# Patient Record
Sex: MALE | Race: WHITE
[De-identification: names, ages, dates, MRNs, and addresses within clinical notes are randomized per-mention and may not be internally consistent; named-entity substitution may affect disease eponyms.]

---

## 2021-03-22 LAB
ALBUMIN SERPL BCP-MCNC: 4.1 G/DL (ref 3.4–5)
ALBUMIN/GLOB SERPL: 1.4 {RATIO} (ref 1.1–1.5)
ALP SERPL-CCNC: 63 IU/L (ref 46–116)
ALT SERPL W P-5'-P-CCNC: 23 U/L (ref 30–65)
ANION GAP SERPL CALCULATED.3IONS-SCNC: 7 MMOL/L (ref 8–16)
APTT PPP: 29 SECONDS (ref 22–32)
AST SERPL W P-5'-P-CCNC: 20 U/L (ref 10–37)
BASOPHILS # BLD AUTO: 0.1 X10'3 (ref 0–0.2)
BASOPHILS NFR BLD AUTO: 2 % (ref 0–1)
BILIRUB SERPL-MCNC: 0.6 MG/DL (ref 0–1)
BUN SERPL-MCNC: 13 MG/DL (ref 7–18)
BUN/CREAT SERPL: 12.7 (ref 5.4–32)
CALCIUM SERPL-MCNC: 9.1 MG/DL (ref 8.5–10.1)
CHLORIDE SERPL-SCNC: 102 MMOL/L (ref 99–107)
CO2 SERPL-SCNC: 28.5 MMOL/L (ref 24–32)
CREAT SERPL-MCNC: 1.02 MG/DL (ref 0.6–1.1)
EOSINOPHIL # BLD AUTO: 0.3 X10'3 (ref 0–0.9)
EOSINOPHIL NFR BLD AUTO: 4.3 % (ref 0–6)
ERYTHROCYTE [DISTWIDTH] IN BLOOD BY AUTOMATED COUNT: 13 % (ref 11.5–14.5)
GFR SERPL CREATININE-BSD FRML MDRD: 71 ML/MIN
GLUCOSE SERPL-MCNC: 91 MG/DL (ref 70–104)
HCT VFR BLD AUTO: 38.4 % (ref 42–52)
HGB BLD-MCNC: 12.9 G/DL (ref 14–17.9)
INR PPP: 1 INR
LYMPHOCYTES # BLD AUTO: 1.9 X10'3 (ref 1.1–4.8)
LYMPHOCYTES NFR BLD AUTO: 29.5 % (ref 21–51)
MCH RBC QN AUTO: 32.2 PG (ref 27–31)
MCHC RBC AUTO-ENTMCNC: 33.7 G/DL (ref 33–36.5)
MCV RBC AUTO: 95.7 FL (ref 78–98)
MONOCYTES # BLD AUTO: 0.8 X10'3 (ref 0–0.9)
MONOCYTES NFR BLD AUTO: 11.7 % (ref 2–12)
NEUTROPHILS # BLD AUTO: 3.4 X10'3 (ref 1.8–7.7)
NEUTROPHILS NFR BLD AUTO: 52.5 % (ref 42–75)
PLATELET # BLD AUTO: 216 X10'3 (ref 140–440)
PMV BLD AUTO: 7 FL (ref 7.4–10.4)
POTASSIUM SERPL-SCNC: 4.1 MMOL/L (ref 3.4–5.1)
PROT SERPL-MCNC: 7.1 G/DL (ref 6.4–8.2)
PROTHROMBIN TIME: 10.4 SECONDS (ref 9–12)
RBC # BLD AUTO: 4.02 X10'6 (ref 4.7–6.1)
SODIUM SERPL-SCNC: 137 MMOL/L (ref 135–145)

## 2021-03-29 ENCOUNTER — HOSPITAL ENCOUNTER (OUTPATIENT)
Dept: HOSPITAL 94 - PAS | Age: 76
LOS: 1 days | Discharge: HOME | End: 2021-03-30
Attending: ORTHOPAEDIC SURGERY
Payer: MEDICARE

## 2021-03-29 VITALS — DIASTOLIC BLOOD PRESSURE: 78 MMHG | SYSTOLIC BLOOD PRESSURE: 124 MMHG

## 2021-03-29 VITALS — DIASTOLIC BLOOD PRESSURE: 69 MMHG | SYSTOLIC BLOOD PRESSURE: 104 MMHG

## 2021-03-29 VITALS — SYSTOLIC BLOOD PRESSURE: 109 MMHG | DIASTOLIC BLOOD PRESSURE: 65 MMHG

## 2021-03-29 VITALS — DIASTOLIC BLOOD PRESSURE: 59 MMHG | SYSTOLIC BLOOD PRESSURE: 101 MMHG

## 2021-03-29 VITALS — SYSTOLIC BLOOD PRESSURE: 109 MMHG | DIASTOLIC BLOOD PRESSURE: 61 MMHG

## 2021-03-29 VITALS — SYSTOLIC BLOOD PRESSURE: 106 MMHG | DIASTOLIC BLOOD PRESSURE: 61 MMHG

## 2021-03-29 VITALS — DIASTOLIC BLOOD PRESSURE: 77 MMHG | SYSTOLIC BLOOD PRESSURE: 119 MMHG

## 2021-03-29 VITALS — WEIGHT: 202.83 LBS | BODY MASS INDEX: 30.04 KG/M2 | HEIGHT: 69 IN

## 2021-03-29 VITALS — SYSTOLIC BLOOD PRESSURE: 137 MMHG | DIASTOLIC BLOOD PRESSURE: 80 MMHG

## 2021-03-29 VITALS — DIASTOLIC BLOOD PRESSURE: 91 MMHG | SYSTOLIC BLOOD PRESSURE: 147 MMHG

## 2021-03-29 VITALS — SYSTOLIC BLOOD PRESSURE: 105 MMHG | DIASTOLIC BLOOD PRESSURE: 60 MMHG

## 2021-03-29 VITALS — SYSTOLIC BLOOD PRESSURE: 125 MMHG | DIASTOLIC BLOOD PRESSURE: 79 MMHG

## 2021-03-29 VITALS — SYSTOLIC BLOOD PRESSURE: 130 MMHG | DIASTOLIC BLOOD PRESSURE: 102 MMHG

## 2021-03-29 VITALS — DIASTOLIC BLOOD PRESSURE: 69 MMHG | SYSTOLIC BLOOD PRESSURE: 111 MMHG

## 2021-03-29 VITALS — SYSTOLIC BLOOD PRESSURE: 117 MMHG | DIASTOLIC BLOOD PRESSURE: 78 MMHG

## 2021-03-29 VITALS — DIASTOLIC BLOOD PRESSURE: 55 MMHG | SYSTOLIC BLOOD PRESSURE: 99 MMHG

## 2021-03-29 VITALS — SYSTOLIC BLOOD PRESSURE: 106 MMHG | DIASTOLIC BLOOD PRESSURE: 54 MMHG

## 2021-03-29 VITALS — DIASTOLIC BLOOD PRESSURE: 78 MMHG | SYSTOLIC BLOOD PRESSURE: 126 MMHG

## 2021-03-29 VITALS — DIASTOLIC BLOOD PRESSURE: 60 MMHG | SYSTOLIC BLOOD PRESSURE: 110 MMHG

## 2021-03-29 DIAGNOSIS — Z79.01: ICD-10-CM

## 2021-03-29 DIAGNOSIS — Z87.891: ICD-10-CM

## 2021-03-29 DIAGNOSIS — Z98.890: ICD-10-CM

## 2021-03-29 DIAGNOSIS — Z79.82: ICD-10-CM

## 2021-03-29 DIAGNOSIS — M16.11: Primary | ICD-10-CM

## 2021-03-29 DIAGNOSIS — Z79.899: ICD-10-CM

## 2021-03-29 DIAGNOSIS — Z88.8: ICD-10-CM

## 2021-03-29 DIAGNOSIS — H40.9: ICD-10-CM

## 2021-03-29 DIAGNOSIS — I10: ICD-10-CM

## 2021-03-29 PROCEDURE — 82948 REAGENT STRIP/BLOOD GLUCOSE: CPT

## 2021-03-29 PROCEDURE — 85730 THROMBOPLASTIN TIME PARTIAL: CPT

## 2021-03-29 PROCEDURE — 97110 THERAPEUTIC EXERCISES: CPT

## 2021-03-29 PROCEDURE — 85025 COMPLETE CBC W/AUTO DIFF WBC: CPT

## 2021-03-29 PROCEDURE — 97535 SELF CARE MNGMENT TRAINING: CPT

## 2021-03-29 PROCEDURE — 86900 BLOOD TYPING SEROLOGIC ABO: CPT

## 2021-03-29 PROCEDURE — 36415 COLL VENOUS BLD VENIPUNCTURE: CPT

## 2021-03-29 PROCEDURE — 72170 X-RAY EXAM OF PELVIS: CPT

## 2021-03-29 PROCEDURE — 86885 COOMBS TEST INDIRECT QUAL: CPT

## 2021-03-29 PROCEDURE — 85610 PROTHROMBIN TIME: CPT

## 2021-03-29 PROCEDURE — 87081 CULTURE SCREEN ONLY: CPT

## 2021-03-29 PROCEDURE — 86901 BLOOD TYPING SEROLOGIC RH(D): CPT

## 2021-03-29 PROCEDURE — 80051 ELECTROLYTE PANEL: CPT

## 2021-03-29 PROCEDURE — 97161 PT EVAL LOW COMPLEX 20 MIN: CPT

## 2021-03-29 PROCEDURE — 80053 COMPREHEN METABOLIC PANEL: CPT

## 2021-03-29 PROCEDURE — 27130 TOTAL HIP ARTHROPLASTY: CPT

## 2021-03-29 PROCEDURE — 97116 GAIT TRAINING THERAPY: CPT

## 2021-03-29 RX ADMIN — DORZOLAMIDE HYDROCHLORIDE SCH DRP: 20 SOLUTION/ DROPS OPHTHALMIC at 20:42

## 2021-03-29 RX ADMIN — SODIUM CHLORIDE, SODIUM LACTATE, POTASSIUM CHLORIDE, AND CALCIUM CHLORIDE SCH MLS/HR: .6; .31; .03; .02 INJECTION, SOLUTION INTRAVENOUS at 06:21

## 2021-03-29 RX ADMIN — HYDROCODONE BITARTRATE AND ACETAMINOPHEN PRN TAB: 10; 325 TABLET ORAL at 12:15

## 2021-03-29 RX ADMIN — CEFAZOLIN SODIUM SCH MLS/HR: 2 INJECTION, SOLUTION INTRAVENOUS at 23:44

## 2021-03-29 RX ADMIN — SODIUM CHLORIDE, SODIUM LACTATE, POTASSIUM CHLORIDE, AND CALCIUM CHLORIDE SCH MLS/HR: .6; .31; .03; .02 INJECTION, SOLUTION INTRAVENOUS at 10:46

## 2021-03-29 RX ADMIN — SODIUM CHLORIDE AND POTASSIUM CHLORIDE SCH MLS/HR: 4.5; 1.49 INJECTION, SOLUTION INTRAVENOUS at 14:48

## 2021-03-29 RX ADMIN — CEFAZOLIN SODIUM SCH MLS/HR: 2 INJECTION, SOLUTION INTRAVENOUS at 16:12

## 2021-03-29 RX ADMIN — SODIUM CHLORIDE, SODIUM LACTATE, POTASSIUM CHLORIDE, AND CALCIUM CHLORIDE SCH MLS/HR: .6; .31; .03; .02 INJECTION, SOLUTION INTRAVENOUS at 08:50

## 2021-03-29 RX ADMIN — HYDROCODONE BITARTRATE AND ACETAMINOPHEN PRN TAB: 10; 325 TABLET ORAL at 20:44

## 2021-03-29 RX ADMIN — SODIUM CHLORIDE AND POTASSIUM CHLORIDE SCH MLS/HR: 4.5; 1.49 INJECTION, SOLUTION INTRAVENOUS at 23:44

## 2021-03-29 RX ADMIN — SODIUM CHLORIDE AND POTASSIUM CHLORIDE SCH MLS/HR: 4.5; 1.49 INJECTION, SOLUTION INTRAVENOUS at 14:51

## 2021-03-29 NOTE — NUR
Problems reprioritized. Patient report given, questions answered & plan of care reviewed 
with Donna MALDONADO.

## 2021-03-29 NOTE — NUR
Patient in room . I have received report from Verena MALDONADO   and had the opportunity to ask 
questions and assume patient care.

## 2021-03-29 NOTE — NUR
Recieved patient from recovery room via the bed, belongings sent with patient and placed 
into closet. Patient is alert and oriented on arrival. Not complaining of pain in the right 
extremity. He is unable to wiggle his toes at this time however has sensation in his knee 
and thigh area and increasing in the foot. Extremity is warm pink and dry, cap refill <2 
seconds, strong pedal pulses, right hip dressing with carlos dressing functioning 
appropriately and no signs of drainage.

## 2021-03-30 VITALS — DIASTOLIC BLOOD PRESSURE: 69 MMHG | SYSTOLIC BLOOD PRESSURE: 127 MMHG

## 2021-03-30 VITALS — SYSTOLIC BLOOD PRESSURE: 115 MMHG | DIASTOLIC BLOOD PRESSURE: 71 MMHG

## 2021-03-30 VITALS — SYSTOLIC BLOOD PRESSURE: 127 MMHG

## 2021-03-30 VITALS — DIASTOLIC BLOOD PRESSURE: 60 MMHG | SYSTOLIC BLOOD PRESSURE: 95 MMHG

## 2021-03-30 LAB
ANION GAP SERPL CALCULATED.3IONS-SCNC: 8 MMOL/L (ref 8–16)
BASOPHILS # BLD AUTO: 0 X10'3 (ref 0–0.2)
BASOPHILS NFR BLD AUTO: 0.6 % (ref 0–1)
CHLORIDE SERPL-SCNC: 98 MMOL/L (ref 99–107)
CO2 SERPL-SCNC: 26 MMOL/L (ref 24–32)
EOSINOPHIL # BLD AUTO: 0.2 X10'3 (ref 0–0.9)
EOSINOPHIL NFR BLD AUTO: 3.9 % (ref 0–6)
ERYTHROCYTE [DISTWIDTH] IN BLOOD BY AUTOMATED COUNT: 12.4 % (ref 11.5–14.5)
HCT VFR BLD AUTO: 30.6 % (ref 42–52)
HGB BLD-MCNC: 10.4 G/DL (ref 14–17.9)
LYMPHOCYTES # BLD AUTO: 1.7 X10'3 (ref 1.1–4.8)
LYMPHOCYTES NFR BLD AUTO: 27.1 % (ref 21–51)
MCH RBC QN AUTO: 32.6 PG (ref 27–31)
MCHC RBC AUTO-ENTMCNC: 33.9 G/DL (ref 33–36.5)
MCV RBC AUTO: 96.2 FL (ref 78–98)
MONOCYTES # BLD AUTO: 0.8 X10'3 (ref 0–0.9)
MONOCYTES NFR BLD AUTO: 13.2 % (ref 2–12)
NEUTROPHILS # BLD AUTO: 3.5 X10'3 (ref 1.8–7.7)
NEUTROPHILS NFR BLD AUTO: 55.2 % (ref 42–75)
PLATELET # BLD AUTO: 168 X10'3 (ref 140–440)
PMV BLD AUTO: 7.3 FL (ref 7.4–10.4)
POTASSIUM SERPL-SCNC: 4.1 MMOL/L (ref 3.5–5.1)
RBC # BLD AUTO: 3.18 X10'6 (ref 4.7–6.1)
SODIUM SERPL-SCNC: 132 MMOL/L (ref 135–145)
WBC # BLD AUTO: 6.4 X10'3 (ref 4.5–11)

## 2021-03-30 RX ADMIN — SODIUM CHLORIDE AND POTASSIUM CHLORIDE SCH MLS/HR: 4.5; 1.49 INJECTION, SOLUTION INTRAVENOUS at 06:50

## 2021-03-30 RX ADMIN — SODIUM CHLORIDE AND POTASSIUM CHLORIDE SCH MLS/HR: 4.5; 1.49 INJECTION, SOLUTION INTRAVENOUS at 14:50

## 2021-03-30 RX ADMIN — DORZOLAMIDE HYDROCHLORIDE SCH DRP: 20 SOLUTION/ DROPS OPHTHALMIC at 09:07

## 2021-03-30 RX ADMIN — HYDROCODONE BITARTRATE AND ACETAMINOPHEN PRN TAB: 10; 325 TABLET ORAL at 05:25

## 2021-03-30 NOTE — NUR
Joint Replacement Consult: Pt s/p R ANDREW PO % avg first regular diet meals meeting 
needs so far. LBM 3/28. No nutrition concerns at this time. To provide initial assessment on 
above date.

-------------------------------------------------------------------------------

Addendum: 03/30/21 at 1236 by [address]

-------------------------------------------------------------------------------

Amended: Links added.

## 2021-03-30 NOTE — NUR
DISCHARGE NOTE: Reviewed discharge instructions with pt. Good verbal feedback. He has 
medications at home already and also has appointment with [facility]. Gave contact info for [facility] 
office. Removed IV, pressure bandage applied, no s/sx bleeding noted, cannula of IV intact. 
Pt. left with his belongings. His sister picked him up in a private vehicle to discharge 
home.  Instructions given on MARIBETH drain and pt. left with coolpaks as well. He had the 
opportunity to ask as many questions as he wanted and was given written education on 
post-operative care specific to his surgery. Pt. given restrictions specific to his surgery. 
He left with immobilizer and states he has a walker at home as well a raised toilet seat. He 
has worked with PT who has cleared him for discharge home.